# Patient Record
Sex: FEMALE | Race: OTHER
[De-identification: names, ages, dates, MRNs, and addresses within clinical notes are randomized per-mention and may not be internally consistent; named-entity substitution may affect disease eponyms.]

---

## 2020-11-21 ENCOUNTER — HOSPITAL ENCOUNTER (INPATIENT)
Dept: HOSPITAL 15 - ER | Age: 30
LOS: 1 days | Discharge: LEFT BEFORE DISCHARGE | DRG: 251 | End: 2020-11-22
Attending: INTERNAL MEDICINE | Admitting: NURSE PRACTITIONER
Payer: MEDICAID

## 2020-11-21 VITALS — HEIGHT: 60 IN | BODY MASS INDEX: 27.05 KG/M2 | WEIGHT: 137.79 LBS

## 2020-11-21 VITALS — DIASTOLIC BLOOD PRESSURE: 70 MMHG | SYSTOLIC BLOOD PRESSURE: 115 MMHG

## 2020-11-21 DIAGNOSIS — K59.00: ICD-10-CM

## 2020-11-21 DIAGNOSIS — N39.0: ICD-10-CM

## 2020-11-21 DIAGNOSIS — Z90.49: ICD-10-CM

## 2020-11-21 DIAGNOSIS — Z91.14: ICD-10-CM

## 2020-11-21 DIAGNOSIS — Z91.018: ICD-10-CM

## 2020-11-21 DIAGNOSIS — E03.9: ICD-10-CM

## 2020-11-21 DIAGNOSIS — Z79.891: ICD-10-CM

## 2020-11-21 DIAGNOSIS — R10.9: Primary | ICD-10-CM

## 2020-11-21 DIAGNOSIS — E87.1: ICD-10-CM

## 2020-11-21 DIAGNOSIS — F17.210: ICD-10-CM

## 2020-11-21 LAB
ALBUMIN SERPL-MCNC: 3.8 G/DL (ref 3.4–5)
ALP SERPL-CCNC: 109 U/L (ref 45–117)
ALT SERPL-CCNC: 16 U/L (ref 13–56)
ANION GAP SERPL CALCULATED.3IONS-SCNC: 8 MMOL/L (ref 5–15)
APTT PPP: 32.2 SEC (ref 23–31.2)
BILIRUB SERPL-MCNC: 0.9 MG/DL (ref 0.2–1)
BUN SERPL-MCNC: 14 MG/DL (ref 7–18)
BUN/CREAT SERPL: 15.1
CALCIUM SERPL-MCNC: 9.5 MG/DL (ref 8.5–10.1)
CHLORIDE SERPL-SCNC: 100 MMOL/L (ref 98–107)
CO2 SERPL-SCNC: 26 MMOL/L (ref 21–32)
GLUCOSE SERPL-MCNC: 102 MG/DL (ref 74–106)
HCT VFR BLD AUTO: 41.2 % (ref 36–46)
HGB BLD-MCNC: 13.3 G/DL (ref 12.2–16.2)
INR PPP: 1.32 (ref 0.9–1.15)
MCH RBC QN AUTO: 26.2 PG (ref 28–32)
MCV RBC AUTO: 81.1 FL (ref 80–100)
NRBC BLD QL AUTO: 0 %
POTASSIUM SERPL-SCNC: 3.9 MMOL/L (ref 3.5–5.1)
PROT SERPL-MCNC: 8.8 G/DL (ref 6.4–8.2)
SODIUM SERPL-SCNC: 134 MMOL/L (ref 136–145)

## 2020-11-21 PROCEDURE — 93005 ELECTROCARDIOGRAM TRACING: CPT

## 2020-11-21 PROCEDURE — 85730 THROMBOPLASTIN TIME PARTIAL: CPT

## 2020-11-21 PROCEDURE — 36415 COLL VENOUS BLD VENIPUNCTURE: CPT

## 2020-11-21 PROCEDURE — 96365 THER/PROPH/DIAG IV INF INIT: CPT

## 2020-11-21 PROCEDURE — 99291 CRITICAL CARE FIRST HOUR: CPT

## 2020-11-21 PROCEDURE — 85610 PROTHROMBIN TIME: CPT

## 2020-11-21 PROCEDURE — 85025 COMPLETE CBC W/AUTO DIFF WBC: CPT

## 2020-11-21 PROCEDURE — 80053 COMPREHEN METABOLIC PANEL: CPT

## 2020-11-21 PROCEDURE — 84436 ASSAY OF TOTAL THYROXINE: CPT

## 2020-11-21 PROCEDURE — 84443 ASSAY THYROID STIM HORMONE: CPT

## 2020-11-21 PROCEDURE — 74176 CT ABD & PELVIS W/O CONTRAST: CPT

## 2020-11-21 NOTE — NUR
Patient Brought to Unit from ER

Patient is AOx4 having signs of pain and discomfort. Patient showing signs of agitation and 
yelling, "my stomach hurts." Patient is on Tele# 37 and running sinus rhythm. Patient is 
laying in bed with HOB at 30 degrees and bed is locked in lowest position. Will give 
medications as prescribed. Will continue to monitor.

## 2020-11-22 ENCOUNTER — HOSPITAL ENCOUNTER (EMERGENCY)
Dept: HOSPITAL 15 - ER | Age: 30
LOS: 1 days | Discharge: LEFT BEFORE DISCHARGE | End: 2020-11-23
Payer: MEDICAID

## 2020-11-22 VITALS — SYSTOLIC BLOOD PRESSURE: 115 MMHG | DIASTOLIC BLOOD PRESSURE: 70 MMHG

## 2020-11-22 VITALS — BODY MASS INDEX: 22.84 KG/M2 | HEIGHT: 61 IN | WEIGHT: 121 LBS

## 2020-11-22 VITALS — DIASTOLIC BLOOD PRESSURE: 70 MMHG | SYSTOLIC BLOOD PRESSURE: 120 MMHG

## 2020-11-22 VITALS — SYSTOLIC BLOOD PRESSURE: 110 MMHG | DIASTOLIC BLOOD PRESSURE: 56 MMHG

## 2020-11-22 VITALS — SYSTOLIC BLOOD PRESSURE: 96 MMHG | DIASTOLIC BLOOD PRESSURE: 64 MMHG

## 2020-11-22 DIAGNOSIS — Z53.21: ICD-10-CM

## 2020-11-22 DIAGNOSIS — R10.9: Primary | ICD-10-CM

## 2020-11-22 PROCEDURE — 36415 COLL VENOUS BLD VENIPUNCTURE: CPT

## 2020-11-22 PROCEDURE — 82150 ASSAY OF AMYLASE: CPT

## 2020-11-22 PROCEDURE — 80053 COMPREHEN METABOLIC PANEL: CPT

## 2020-11-22 PROCEDURE — 84702 CHORIONIC GONADOTROPIN TEST: CPT

## 2020-11-22 PROCEDURE — 80307 DRUG TEST PRSMV CHEM ANLYZR: CPT

## 2020-11-22 PROCEDURE — 85730 THROMBOPLASTIN TIME PARTIAL: CPT

## 2020-11-22 PROCEDURE — 87086 URINE CULTURE/COLONY COUNT: CPT

## 2020-11-22 PROCEDURE — 83735 ASSAY OF MAGNESIUM: CPT

## 2020-11-22 PROCEDURE — 83690 ASSAY OF LIPASE: CPT

## 2020-11-22 PROCEDURE — 85027 COMPLETE CBC AUTOMATED: CPT

## 2020-11-22 PROCEDURE — 85610 PROTHROMBIN TIME: CPT

## 2020-11-22 PROCEDURE — 84443 ASSAY THYROID STIM HORMONE: CPT

## 2020-11-22 PROCEDURE — 85007 BL SMEAR W/DIFF WBC COUNT: CPT

## 2020-11-22 PROCEDURE — 83605 ASSAY OF LACTIC ACID: CPT

## 2020-11-22 PROCEDURE — 81001 URINALYSIS AUTO W/SCOPE: CPT

## 2020-11-22 PROCEDURE — 84484 ASSAY OF TROPONIN QUANT: CPT

## 2020-11-22 PROCEDURE — 87040 BLOOD CULTURE FOR BACTERIA: CPT

## 2020-11-22 RX ADMIN — ONDANSETRON HYDROCHLORIDE PRN MG: 2 INJECTION, SOLUTION INTRAMUSCULAR; INTRAVENOUS at 06:46

## 2020-11-22 RX ADMIN — MORPHINE SULFATE PRN MG: 4 INJECTION, SOLUTION INTRAMUSCULAR; INTRAVENOUS at 00:03

## 2020-11-22 RX ADMIN — MORPHINE SULFATE PRN MG: 4 INJECTION, SOLUTION INTRAMUSCULAR; INTRAVENOUS at 08:40

## 2020-11-22 RX ADMIN — ONDANSETRON HYDROCHLORIDE PRN MG: 2 INJECTION, SOLUTION INTRAMUSCULAR; INTRAVENOUS at 00:33

## 2020-11-22 RX ADMIN — MORPHINE SULFATE PRN MG: 4 INJECTION, SOLUTION INTRAMUSCULAR; INTRAVENOUS at 04:03

## 2020-11-22 RX ADMIN — HYDROCODONE BITARTRATE AND ACETAMINOPHEN PRN TAB: 5; 325 TABLET ORAL at 06:47

## 2020-11-22 RX ADMIN — HYDROCODONE BITARTRATE AND ACETAMINOPHEN PRN TAB: 5; 325 TABLET ORAL at 02:44

## 2020-11-22 NOTE — NUR
Patient Wants to Leave

Patient said " I want to go home." Educated patient that if she feels pain when she leaves 
she may have to go through ER again. Patient said , "I just don't feel good." Patient aware 
and said she will "just stay." Will continue to monitor.

## 2020-11-22 NOTE — NUR
ELOPEMENT

PATIENT MISSING FROM BED. TELE BOX LAYING ON BED. PATIENT STILL WITH IV IN PLACE.  
CALLED.

## 2020-11-22 NOTE — NUR
Patient Complaining of Pain

Patient is complaining of abdominal pain. Patient is yelling loudly from room, "Ow my 
stomach hurts!." Acknowledged patient's concerns and told her I will contact hospitalist for 
orders to help with pain. Patient is not due for morphine PRN. She claims it didn't help her 
pain level. Patient kept yelling, " I don't feel good." Hospitalist paged at this time. 

-------------------------------------------------------------------------------

Addendum: 11/22/20 at 0228 by MADELIN LUCAS RN RN

-------------------------------------------------------------------------------

Paged to hospitalist a second time. waiting for a call back. Patient currently in pain and 
having nausea.

## 2020-11-23 LAB
ALBUMIN SERPL-MCNC: 3.1 G/DL (ref 3.4–5)
ALCOHOL, URINE: 4 MG/DL (ref 0–10)
ALP SERPL-CCNC: 100 U/L (ref 45–117)
ALT SERPL-CCNC: 14 U/L (ref 13–56)
AMPHETAMINES UR QL SCN: POSITIVE
AMYLASE SERPL-CCNC: 14 U/L (ref 25–115)
ANION GAP SERPL CALCULATED.3IONS-SCNC: 5 MMOL/L (ref 5–15)
APTT PPP: 35 SEC (ref 23–31.2)
BARBITURATES UR QL SCN: NEGATIVE
BENZODIAZ UR QL SCN: NEGATIVE
BILIRUB SERPL-MCNC: 0.3 MG/DL (ref 0.2–1)
BUN SERPL-MCNC: 12 MG/DL (ref 7–18)
BUN/CREAT SERPL: 12.9
BZE UR QL SCN: NEGATIVE
CALCIUM SERPL-MCNC: 8.5 MG/DL (ref 8.5–10.1)
CANNABINOIDS UR QL SCN: NEGATIVE
CHLORIDE SERPL-SCNC: 101 MMOL/L (ref 98–107)
CO2 SERPL-SCNC: 26 MMOL/L (ref 21–32)
GLUCOSE SERPL-MCNC: 92 MG/DL (ref 74–106)
HCT VFR BLD AUTO: 37.4 % (ref 36–46)
HGB BLD-MCNC: 12 G/DL (ref 12.2–16.2)
INR PPP: 1.34 (ref 0.9–1.15)
LACTATE PLASV-SCNC: 3 MMOL/L (ref 0.4–2)
LIPASE SERPL-CCNC: 20 U/L (ref 73–393)
MAGNESIUM SERPL-MCNC: 2.1 MG/DL (ref 1.6–2.6)
MCH RBC QN AUTO: 25.8 PG (ref 28–32)
MCV RBC AUTO: 80.3 FL (ref 80–100)
OPIATES UR QL SCN: POSITIVE
PCP UR QL SCN: NEGATIVE
POTASSIUM SERPL-SCNC: 3.8 MMOL/L (ref 3.5–5.1)
PROT SERPL-MCNC: 8.2 G/DL (ref 6.4–8.2)
SODIUM SERPL-SCNC: 132 MMOL/L (ref 136–145)

## 2020-11-24 ENCOUNTER — HOSPITAL ENCOUNTER (INPATIENT)
Dept: HOSPITAL 15 - ER | Age: 30
Discharge: LEFT BEFORE DISCHARGE | DRG: 532 | End: 2020-11-24
Attending: INTERNAL MEDICINE | Admitting: INTERNAL MEDICINE
Payer: MEDICAID

## 2020-11-24 VITALS — DIASTOLIC BLOOD PRESSURE: 66 MMHG | SYSTOLIC BLOOD PRESSURE: 94 MMHG

## 2020-11-24 VITALS — WEIGHT: 120 LBS | BODY MASS INDEX: 22.66 KG/M2 | HEIGHT: 61 IN

## 2020-11-24 DIAGNOSIS — B95.7: ICD-10-CM

## 2020-11-24 DIAGNOSIS — E87.1: ICD-10-CM

## 2020-11-24 DIAGNOSIS — T40.2X5A: ICD-10-CM

## 2020-11-24 DIAGNOSIS — K56.0: ICD-10-CM

## 2020-11-24 DIAGNOSIS — E43: ICD-10-CM

## 2020-11-24 DIAGNOSIS — F15.10: ICD-10-CM

## 2020-11-24 DIAGNOSIS — F13.10: ICD-10-CM

## 2020-11-24 DIAGNOSIS — F17.210: ICD-10-CM

## 2020-11-24 DIAGNOSIS — F12.10: ICD-10-CM

## 2020-11-24 DIAGNOSIS — E03.9: ICD-10-CM

## 2020-11-24 DIAGNOSIS — N83.512: Primary | ICD-10-CM

## 2020-11-24 DIAGNOSIS — Z91.018: ICD-10-CM

## 2020-11-24 DIAGNOSIS — N39.0: ICD-10-CM

## 2020-11-24 DIAGNOSIS — K52.9: ICD-10-CM

## 2020-11-24 LAB
ALBUMIN SERPL-MCNC: 2.6 G/DL (ref 3.4–5)
ALCOHOL, URINE: < 3 MG/DL (ref 0–10)
ALP SERPL-CCNC: 112 U/L (ref 45–117)
ALT SERPL-CCNC: 20 U/L (ref 13–56)
AMPHETAMINES UR QL SCN: POSITIVE
ANION GAP SERPL CALCULATED.3IONS-SCNC: 3 MMOL/L (ref 5–15)
BARBITURATES UR QL SCN: NEGATIVE
BENZODIAZ UR QL SCN: NEGATIVE
BILIRUB SERPL-MCNC: 0.2 MG/DL (ref 0.2–1)
BUN SERPL-MCNC: 14 MG/DL (ref 7–18)
BUN/CREAT SERPL: 21.9
BZE UR QL SCN: NEGATIVE
CALCIUM SERPL-MCNC: 8.8 MG/DL (ref 8.5–10.1)
CANNABINOIDS UR QL SCN: NEGATIVE
CHLORIDE SERPL-SCNC: 103 MMOL/L (ref 98–107)
CHOLEST SERPL-MCNC: 145 MG/DL (ref ?–200)
CO2 SERPL-SCNC: 27 MMOL/L (ref 21–32)
GLUCOSE SERPL-MCNC: 126 MG/DL (ref 74–106)
HCT VFR BLD AUTO: 36 % (ref 36–46)
HDLC SERPL-MCNC: 11 MG/DL (ref 40–59)
HGB BLD-MCNC: 11.7 G/DL (ref 12.2–16.2)
MCH RBC QN AUTO: 26.2 PG (ref 28–32)
MCV RBC AUTO: 80.4 FL (ref 80–100)
NRBC BLD QL AUTO: 0 %
OPIATES UR QL SCN: POSITIVE
PCP UR QL SCN: NEGATIVE
POTASSIUM SERPL-SCNC: 4.5 MMOL/L (ref 3.5–5.1)
PROT SERPL-MCNC: 7.7 G/DL (ref 6.4–8.2)
SODIUM SERPL-SCNC: 133 MMOL/L (ref 136–145)
TRIGL SERPL-MCNC: 320 MG/DL (ref ?–150)

## 2020-11-24 PROCEDURE — 96375 TX/PRO/DX INJ NEW DRUG ADDON: CPT

## 2020-11-24 PROCEDURE — 84443 ASSAY THYROID STIM HORMONE: CPT

## 2020-11-24 PROCEDURE — 71045 X-RAY EXAM CHEST 1 VIEW: CPT

## 2020-11-24 PROCEDURE — 74177 CT ABD & PELVIS W/CONTRAST: CPT

## 2020-11-24 PROCEDURE — 80307 DRUG TEST PRSMV CHEM ANLYZR: CPT

## 2020-11-24 PROCEDURE — 76856 US EXAM PELVIC COMPLETE: CPT

## 2020-11-24 PROCEDURE — 84484 ASSAY OF TROPONIN QUANT: CPT

## 2020-11-24 PROCEDURE — 86304 IMMUNOASSAY TUMOR CA 125: CPT

## 2020-11-24 PROCEDURE — 81001 URINALYSIS AUTO W/SCOPE: CPT

## 2020-11-24 PROCEDURE — 96374 THER/PROPH/DIAG INJ IV PUSH: CPT

## 2020-11-24 PROCEDURE — 85025 COMPLETE CBC W/AUTO DIFF WBC: CPT

## 2020-11-24 PROCEDURE — 83036 HEMOGLOBIN GLYCOSYLATED A1C: CPT

## 2020-11-24 PROCEDURE — 36415 COLL VENOUS BLD VENIPUNCTURE: CPT

## 2020-11-24 PROCEDURE — 80061 LIPID PANEL: CPT

## 2020-11-24 PROCEDURE — 96361 HYDRATE IV INFUSION ADD-ON: CPT

## 2020-11-24 PROCEDURE — 87086 URINE CULTURE/COLONY COUNT: CPT

## 2020-11-24 PROCEDURE — 76830 TRANSVAGINAL US NON-OB: CPT

## 2020-11-24 PROCEDURE — 80053 COMPREHEN METABOLIC PANEL: CPT

## 2021-06-10 ENCOUNTER — HOSPITAL ENCOUNTER (EMERGENCY)
Dept: HOSPITAL 15 - ER | Age: 31
Discharge: HOME | End: 2021-06-10
Payer: MEDICAID

## 2021-06-10 VITALS — DIASTOLIC BLOOD PRESSURE: 78 MMHG | SYSTOLIC BLOOD PRESSURE: 113 MMHG

## 2021-06-10 VITALS — HEIGHT: 61 IN | BODY MASS INDEX: 24.55 KG/M2 | WEIGHT: 130 LBS

## 2021-06-10 DIAGNOSIS — F17.210: ICD-10-CM

## 2021-06-10 DIAGNOSIS — R10.31: Primary | ICD-10-CM

## 2021-06-10 DIAGNOSIS — Z88.6: ICD-10-CM

## 2021-06-10 DIAGNOSIS — F12.10: ICD-10-CM

## 2021-06-10 DIAGNOSIS — F15.10: ICD-10-CM

## 2021-06-10 LAB
ALBUMIN SERPL-MCNC: 4 G/DL (ref 3.4–5)
ALP SERPL-CCNC: 77 U/L (ref 45–117)
ALT SERPL-CCNC: 25 U/L (ref 13–56)
ANION GAP SERPL CALCULATED.3IONS-SCNC: 10 MMOL/L (ref 5–15)
BILIRUB SERPL-MCNC: 0.4 MG/DL (ref 0.2–1)
BUN SERPL-MCNC: 14 MG/DL (ref 7–18)
BUN/CREAT SERPL: 16.3
CALCIUM SERPL-MCNC: 9.1 MG/DL (ref 8.5–10.1)
CHLORIDE SERPL-SCNC: 102 MMOL/L (ref 98–107)
CO2 SERPL-SCNC: 22 MMOL/L (ref 21–32)
GLUCOSE SERPL-MCNC: 111 MG/DL (ref 74–106)
HCT VFR BLD AUTO: 40.2 % (ref 36–46)
HGB BLD-MCNC: 13.4 G/DL (ref 12.2–16.2)
MAGNESIUM SERPL-MCNC: 2.2 MG/DL (ref 1.6–2.6)
MCH RBC QN AUTO: 26 PG (ref 28–32)
MCV RBC AUTO: 78.2 FL (ref 80–100)
NRBC BLD QL AUTO: 0.3 %
POTASSIUM SERPL-SCNC: 3.6 MMOL/L (ref 3.5–5.1)
PROT SERPL-MCNC: 8.3 G/DL (ref 6.4–8.2)
SODIUM SERPL-SCNC: 134 MMOL/L (ref 136–145)

## 2021-06-10 PROCEDURE — 99284 EMERGENCY DEPT VISIT MOD MDM: CPT

## 2021-06-10 PROCEDURE — 96374 THER/PROPH/DIAG INJ IV PUSH: CPT

## 2021-06-10 PROCEDURE — 85025 COMPLETE CBC W/AUTO DIFF WBC: CPT

## 2021-06-10 PROCEDURE — 83735 ASSAY OF MAGNESIUM: CPT

## 2021-06-10 PROCEDURE — 96375 TX/PRO/DX INJ NEW DRUG ADDON: CPT

## 2021-06-10 PROCEDURE — 36415 COLL VENOUS BLD VENIPUNCTURE: CPT

## 2021-06-10 PROCEDURE — 80053 COMPREHEN METABOLIC PANEL: CPT

## 2022-05-11 ENCOUNTER — HOSPITAL ENCOUNTER (OUTPATIENT)
Dept: HOSPITAL 15 - LDRP | Age: 32
Setting detail: OBSERVATION
Discharge: HOME | End: 2022-05-11
Attending: OBSTETRICS & GYNECOLOGY | Admitting: OBSTETRICS & GYNECOLOGY
Payer: MEDICAID

## 2022-05-11 VITALS — BODY MASS INDEX: 29.27 KG/M2 | HEIGHT: 61 IN | WEIGHT: 155 LBS

## 2022-05-11 DIAGNOSIS — Z3A.26: ICD-10-CM

## 2022-05-11 DIAGNOSIS — Z53.29: ICD-10-CM

## 2022-05-11 DIAGNOSIS — E03.9: ICD-10-CM

## 2022-05-11 DIAGNOSIS — O99.282: ICD-10-CM

## 2022-05-11 DIAGNOSIS — Z20.822: ICD-10-CM

## 2022-05-11 DIAGNOSIS — O23.02: Primary | ICD-10-CM

## 2022-05-11 LAB
ALBUMIN SERPL-MCNC: 3.3 G/DL (ref 3.4–5)
ALCOHOL, URINE: < 3 MG/DL (ref 0–10)
ALP SERPL-CCNC: 85 U/L (ref 45–117)
ALT SERPL-CCNC: 22 U/L (ref 13–56)
AMPHETAMINES UR QL SCN: POSITIVE
AMYLASE SERPL-CCNC: 23 U/L (ref 25–115)
ANION GAP SERPL CALCULATED.3IONS-SCNC: 10 MMOL/L (ref 5–15)
APTT PPP: 25.6 SEC (ref 23.6–33)
BARBITURATES UR QL SCN: NEGATIVE
BENZODIAZ UR QL SCN: NEGATIVE
BILIRUB SERPL-MCNC: 0.6 MG/DL (ref 0.2–1)
BUN SERPL-MCNC: 7 MG/DL (ref 7–18)
BUN/CREAT SERPL: 9.6
BZE UR QL SCN: NEGATIVE
CALCIUM SERPL-MCNC: 9.3 MG/DL (ref 8.5–10.1)
CANNABINOIDS UR QL SCN: NEGATIVE
CHLORIDE SERPL-SCNC: 102 MMOL/L (ref 98–107)
CO2 SERPL-SCNC: 24 MMOL/L (ref 21–32)
GLUCOSE SERPL-MCNC: 101 MG/DL (ref 74–106)
HCT VFR BLD AUTO: 38.7 % (ref 36–46)
HGB BLD-MCNC: 13.1 G/DL (ref 12.2–16.2)
INR PPP: 1.01 (ref 0.9–1.15)
LIPASE SERPL-CCNC: 30 U/L (ref 73–393)
MCH RBC QN AUTO: 27 PG (ref 28–32)
MCV RBC AUTO: 79.7 FL (ref 80–100)
OPIATES UR QL SCN: NEGATIVE
PCP UR QL SCN: NEGATIVE
POTASSIUM SERPL-SCNC: 3.4 MMOL/L (ref 3.5–5.1)
PROT SERPL-MCNC: 8 G/DL (ref 6.4–8.2)
SODIUM SERPL-SCNC: 136 MMOL/L (ref 136–145)

## 2022-05-11 PROCEDURE — 96365 THER/PROPH/DIAG IV INF INIT: CPT

## 2022-05-11 PROCEDURE — 86900 BLOOD TYPING SEROLOGIC ABO: CPT

## 2022-05-11 PROCEDURE — 36415 COLL VENOUS BLD VENIPUNCTURE: CPT

## 2022-05-11 PROCEDURE — 96361 HYDRATE IV INFUSION ADD-ON: CPT

## 2022-05-11 PROCEDURE — 85027 COMPLETE CBC AUTOMATED: CPT

## 2022-05-11 PROCEDURE — 81002 URINALYSIS NONAUTO W/O SCOPE: CPT

## 2022-05-11 PROCEDURE — 86592 SYPHILIS TEST NON-TREP QUAL: CPT

## 2022-05-11 PROCEDURE — 82150 ASSAY OF AMYLASE: CPT

## 2022-05-11 PROCEDURE — 83690 ASSAY OF LIPASE: CPT

## 2022-05-11 PROCEDURE — 86762 RUBELLA ANTIBODY: CPT

## 2022-05-11 PROCEDURE — 94760 N-INVAS EAR/PLS OXIMETRY 1: CPT

## 2022-05-11 PROCEDURE — 87426 SARSCOV CORONAVIRUS AG IA: CPT

## 2022-05-11 PROCEDURE — 76775 US EXAM ABDO BACK WALL LIM: CPT

## 2022-05-11 PROCEDURE — 85610 PROTHROMBIN TIME: CPT

## 2022-05-11 PROCEDURE — 85007 BL SMEAR W/DIFF WBC COUNT: CPT

## 2022-05-11 PROCEDURE — 86703 HIV-1/HIV-2 1 RESULT ANTBDY: CPT

## 2022-05-11 PROCEDURE — 85730 THROMBOPLASTIN TIME PARTIAL: CPT

## 2022-05-11 PROCEDURE — 59025 FETAL NON-STRESS TEST: CPT

## 2022-05-11 PROCEDURE — 86901 BLOOD TYPING SEROLOGIC RH(D): CPT

## 2022-05-11 PROCEDURE — 80307 DRUG TEST PRSMV CHEM ANLYZR: CPT

## 2022-05-11 PROCEDURE — 80053 COMPREHEN METABOLIC PANEL: CPT

## 2022-05-11 PROCEDURE — 76805 OB US >/= 14 WKS SNGL FETUS: CPT

## 2022-05-11 PROCEDURE — 87340 HEPATITIS B SURFACE AG IA: CPT

## 2022-05-11 PROCEDURE — 81001 URINALYSIS AUTO W/SCOPE: CPT

## 2022-05-11 PROCEDURE — 86850 RBC ANTIBODY SCREEN: CPT

## 2022-05-11 PROCEDURE — 76705 ECHO EXAM OF ABDOMEN: CPT

## 2022-05-11 PROCEDURE — 96360 HYDRATION IV INFUSION INIT: CPT

## 2022-05-12 LAB — RUBV IGG SERPL IA-ACNC: 9.55 INDEX

## 2022-05-31 ENCOUNTER — HOSPITAL ENCOUNTER (OUTPATIENT)
Dept: HOSPITAL 15 - LDRP | Age: 32
Setting detail: OBSERVATION
Discharge: HOME | End: 2022-05-31
Attending: FAMILY MEDICINE | Admitting: FAMILY MEDICINE
Payer: MEDICAID

## 2022-05-31 VITALS — BODY MASS INDEX: 30.58 KG/M2 | WEIGHT: 162 LBS | HEIGHT: 61 IN

## 2022-05-31 DIAGNOSIS — R10.9: ICD-10-CM

## 2022-05-31 DIAGNOSIS — O26.893: Primary | ICD-10-CM

## 2022-05-31 DIAGNOSIS — Z79.899: ICD-10-CM

## 2022-05-31 DIAGNOSIS — Z3A.29: ICD-10-CM

## 2022-05-31 LAB
ALCOHOL, URINE: 4 MG/DL (ref 0–10)
AMPHETAMINES UR QL SCN: POSITIVE
BARBITURATES UR QL SCN: NEGATIVE
BENZODIAZ UR QL SCN: NEGATIVE
BZE UR QL SCN: NEGATIVE
CANNABINOIDS UR QL SCN: NEGATIVE
OPIATES UR QL SCN: NEGATIVE
PCP UR QL SCN: NEGATIVE

## 2022-05-31 PROCEDURE — 94760 N-INVAS EAR/PLS OXIMETRY 1: CPT

## 2022-05-31 PROCEDURE — 59025 FETAL NON-STRESS TEST: CPT

## 2022-05-31 PROCEDURE — 81001 URINALYSIS AUTO W/SCOPE: CPT

## 2022-05-31 PROCEDURE — 80307 DRUG TEST PRSMV CHEM ANLYZR: CPT

## 2022-05-31 PROCEDURE — 81002 URINALYSIS NONAUTO W/O SCOPE: CPT

## 2022-08-29 ENCOUNTER — HOSPITAL ENCOUNTER (OUTPATIENT)
Dept: HOSPITAL 15 - LDRP | Age: 32
Setting detail: OBSERVATION
Discharge: HOME | End: 2022-08-29
Attending: FAMILY MEDICINE | Admitting: FAMILY MEDICINE
Payer: COMMERCIAL

## 2022-08-29 DIAGNOSIS — Z3A.38: ICD-10-CM

## 2022-08-29 DIAGNOSIS — O48.0: ICD-10-CM

## 2022-08-29 PROCEDURE — 81002 URINALYSIS NONAUTO W/O SCOPE: CPT

## 2022-08-29 PROCEDURE — 59025 FETAL NON-STRESS TEST: CPT

## 2022-08-29 PROCEDURE — 76805 OB US >/= 14 WKS SNGL FETUS: CPT

## 2022-08-29 PROCEDURE — 76818 FETAL BIOPHYS PROFILE W/NST: CPT

## 2022-08-29 PROCEDURE — 87081 CULTURE SCREEN ONLY: CPT

## 2022-08-31 ENCOUNTER — HOSPITAL ENCOUNTER (OUTPATIENT)
Dept: HOSPITAL 15 - LDRP | Age: 32
Discharge: HOME | End: 2022-08-31
Attending: OBSTETRICS & GYNECOLOGY
Payer: COMMERCIAL

## 2022-08-31 DIAGNOSIS — Z3A.37: ICD-10-CM

## 2022-08-31 DIAGNOSIS — O40.3XX1: Primary | ICD-10-CM

## 2022-08-31 PROCEDURE — 76818 FETAL BIOPHYS PROFILE W/NST: CPT

## 2022-09-09 ENCOUNTER — HOSPITAL ENCOUNTER (INPATIENT)
Dept: HOSPITAL 15 - LDRP | Age: 32
LOS: 1 days | Discharge: HOME | DRG: 560 | End: 2022-09-10
Attending: OBSTETRICS & GYNECOLOGY | Admitting: OBSTETRICS & GYNECOLOGY
Payer: COMMERCIAL

## 2022-09-09 VITALS — BODY MASS INDEX: 32.1 KG/M2 | HEIGHT: 61 IN | WEIGHT: 170 LBS

## 2022-09-09 VITALS — SYSTOLIC BLOOD PRESSURE: 103 MMHG | DIASTOLIC BLOOD PRESSURE: 70 MMHG

## 2022-09-09 VITALS — SYSTOLIC BLOOD PRESSURE: 109 MMHG | DIASTOLIC BLOOD PRESSURE: 68 MMHG

## 2022-09-09 VITALS — SYSTOLIC BLOOD PRESSURE: 114 MMHG | DIASTOLIC BLOOD PRESSURE: 70 MMHG

## 2022-09-09 VITALS — SYSTOLIC BLOOD PRESSURE: 118 MMHG | DIASTOLIC BLOOD PRESSURE: 72 MMHG

## 2022-09-09 DIAGNOSIS — Z20.822: ICD-10-CM

## 2022-09-09 DIAGNOSIS — F11.90: ICD-10-CM

## 2022-09-09 DIAGNOSIS — Z91.018: ICD-10-CM

## 2022-09-09 DIAGNOSIS — Z3A.39: ICD-10-CM

## 2022-09-09 LAB
ALBUMIN SERPL-MCNC: 2.6 G/DL (ref 3.4–5)
ALCOHOL, URINE: 7 MG/DL (ref 0–10)
ALCOHOL, URINE: < 3 MG/DL (ref 0–10)
ALP SERPL-CCNC: 198 U/L (ref 45–117)
ALT SERPL-CCNC: 20 U/L (ref 13–56)
AMPHETAMINES UR QL SCN: NEGATIVE
AMPHETAMINES UR QL SCN: NEGATIVE
ANION GAP SERPL CALCULATED.3IONS-SCNC: 5 MMOL/L (ref 5–15)
APTT PPP: 33.6 SEC (ref 24.6–33.4)
BARBITURATES UR QL SCN: NEGATIVE
BARBITURATES UR QL SCN: NEGATIVE
BENZODIAZ UR QL SCN: NEGATIVE
BENZODIAZ UR QL SCN: NEGATIVE
BILIRUB SERPL-MCNC: 0.2 MG/DL (ref 0.2–1)
BUN SERPL-MCNC: 19 MG/DL (ref 7–18)
BUN/CREAT SERPL: 25.3
BZE UR QL SCN: NEGATIVE
BZE UR QL SCN: NEGATIVE
CALCIUM SERPL-MCNC: 9.1 MG/DL (ref 8.5–10.1)
CANNABINOIDS UR QL SCN: NEGATIVE
CANNABINOIDS UR QL SCN: NEGATIVE
CHLORIDE SERPL-SCNC: 106 MMOL/L (ref 98–107)
CO2 SERPL-SCNC: 22 MMOL/L (ref 21–32)
GLUCOSE SERPL-MCNC: 83 MG/DL (ref 74–106)
HCT VFR BLD AUTO: 42.6 % (ref 36–46)
HGB BLD-MCNC: 13.6 G/DL (ref 12.2–16.2)
INR PPP: 0.92 (ref 0.9–1.15)
MCH RBC QN AUTO: 26.2 PG (ref 28–32)
MCV RBC AUTO: 82.2 FL (ref 80–100)
NRBC BLD QL AUTO: 0.1 %
OPIATES UR QL SCN: NEGATIVE
OPIATES UR QL SCN: NEGATIVE
PCP UR QL SCN: NEGATIVE
PCP UR QL SCN: NEGATIVE
POTASSIUM SERPL-SCNC: 4.5 MMOL/L (ref 3.5–5.1)
PROT SERPL-MCNC: 6.6 G/DL (ref 6.4–8.2)
SODIUM SERPL-SCNC: 133 MMOL/L (ref 136–145)

## 2022-09-09 PROCEDURE — 85610 PROTHROMBIN TIME: CPT

## 2022-09-09 PROCEDURE — 86901 BLOOD TYPING SEROLOGIC RH(D): CPT

## 2022-09-09 PROCEDURE — 96360 HYDRATION IV INFUSION INIT: CPT

## 2022-09-09 PROCEDURE — 96361 HYDRATE IV INFUSION ADD-ON: CPT

## 2022-09-09 PROCEDURE — 96365 THER/PROPH/DIAG IV INF INIT: CPT

## 2022-09-09 PROCEDURE — 81002 URINALYSIS NONAUTO W/O SCOPE: CPT

## 2022-09-09 PROCEDURE — 94760 N-INVAS EAR/PLS OXIMETRY 1: CPT

## 2022-09-09 PROCEDURE — 81001 URINALYSIS AUTO W/SCOPE: CPT

## 2022-09-09 PROCEDURE — 85730 THROMBOPLASTIN TIME PARTIAL: CPT

## 2022-09-09 PROCEDURE — 80307 DRUG TEST PRSMV CHEM ANLYZR: CPT

## 2022-09-09 PROCEDURE — 86900 BLOOD TYPING SEROLOGIC ABO: CPT

## 2022-09-09 PROCEDURE — 59409 OBSTETRICAL CARE: CPT

## 2022-09-09 PROCEDURE — 0KQM0ZZ REPAIR PERINEUM MUSCLE, OPEN APPROACH: ICD-10-PCS | Performed by: OBSTETRICS & GYNECOLOGY

## 2022-09-09 PROCEDURE — 96366 THER/PROPH/DIAG IV INF ADDON: CPT

## 2022-09-09 PROCEDURE — 85025 COMPLETE CBC W/AUTO DIFF WBC: CPT

## 2022-09-09 PROCEDURE — 59025 FETAL NON-STRESS TEST: CPT

## 2022-09-09 PROCEDURE — 86850 RBC ANTIBODY SCREEN: CPT

## 2022-09-09 PROCEDURE — 80053 COMPREHEN METABOLIC PANEL: CPT

## 2022-09-09 PROCEDURE — 96372 THER/PROPH/DIAG INJ SC/IM: CPT

## 2022-09-09 PROCEDURE — 36415 COLL VENOUS BLD VENIPUNCTURE: CPT

## 2022-09-09 PROCEDURE — 86592 SYPHILIS TEST NON-TREP QUAL: CPT

## 2022-09-09 RX ADMIN — Medication PRN APPLIC: at 13:47

## 2022-09-09 RX ADMIN — SODIUM CHLORIDE, SODIUM LACTATE, POTASSIUM CHLORIDE, AND CALCIUM CHLORIDE SCH MLS/HR: .6; .31; .03; .02 INJECTION, SOLUTION INTRAVENOUS at 16:56

## 2022-09-09 RX ADMIN — Medication PRN APPLIC: at 08:42

## 2022-09-09 RX ADMIN — WITCH HAZEL PRN PAD: 500 SOLUTION RECTAL; TOPICAL at 08:42

## 2022-09-09 RX ADMIN — IBUPROFEN PRN MG: 600 TABLET, FILM COATED ORAL at 13:42

## 2022-09-09 RX ADMIN — IBUPROFEN PRN MG: 600 TABLET, FILM COATED ORAL at 22:16

## 2022-09-09 RX ADMIN — IBUPROFEN PRN MG: 600 TABLET, FILM COATED ORAL at 10:46

## 2022-09-09 RX ADMIN — WITCH HAZEL PRN PAD: 500 SOLUTION RECTAL; TOPICAL at 13:47

## 2022-09-09 RX ADMIN — ACETAMINOPHEN PRN MG: 325 TABLET ORAL at 19:19

## 2022-09-09 RX ADMIN — SODIUM CHLORIDE, SODIUM LACTATE, POTASSIUM CHLORIDE, AND CALCIUM CHLORIDE SCH MLS/HR: .6; .31; .03; .02 INJECTION, SOLUTION INTRAVENOUS at 16:57

## 2022-09-10 VITALS — DIASTOLIC BLOOD PRESSURE: 66 MMHG | SYSTOLIC BLOOD PRESSURE: 103 MMHG

## 2022-09-10 VITALS — SYSTOLIC BLOOD PRESSURE: 104 MMHG | DIASTOLIC BLOOD PRESSURE: 64 MMHG

## 2022-09-10 RX ADMIN — ACETAMINOPHEN PRN MG: 325 TABLET ORAL at 03:04

## 2023-04-08 ENCOUNTER — HOSPITAL ENCOUNTER (EMERGENCY)
Dept: HOSPITAL 15 - ER | Age: 33
LOS: 1 days | Discharge: HOME | End: 2023-04-09
Payer: MEDICAID

## 2023-04-08 VITALS — DIASTOLIC BLOOD PRESSURE: 67 MMHG | SYSTOLIC BLOOD PRESSURE: 132 MMHG

## 2023-04-08 VITALS — WEIGHT: 163.14 LBS | HEIGHT: 61 IN | BODY MASS INDEX: 30.8 KG/M2
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
## 2023-04-08 DIAGNOSIS — F14.10: ICD-10-CM

## 2023-04-08 DIAGNOSIS — F12.10: ICD-10-CM
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
## 2023-04-08 DIAGNOSIS — Z88.6: ICD-10-CM

## 2023-04-08 DIAGNOSIS — S09.8XXA: Primary | ICD-10-CM

## 2023-04-08 DIAGNOSIS — R07.89: ICD-10-CM
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
## 2023-04-08 DIAGNOSIS — F17.210: ICD-10-CM

## 2023-04-08 DIAGNOSIS — F15.10: ICD-10-CM

## 2023-04-08 PROCEDURE — 72125 CT NECK SPINE W/O DYE: CPT

## 2023-04-08 PROCEDURE — 70450 CT HEAD/BRAIN W/O DYE: CPT
